# Patient Record
(demographics unavailable — no encounter records)

---

## 2025-01-17 NOTE — HISTORY OF PRESENT ILLNESS
[FreeTextEntry1] : follow-up, HTN, CVA [de-identified] : 63 year old male presents for a f/u visit. Currently he has no acute medical complaints. He denies any chest pain, palpitations or shortness of breath.

## 2025-01-17 NOTE — HISTORY OF PRESENT ILLNESS
[FreeTextEntry1] : follow-up, HTN, CVA [de-identified] : 63 year old male presents for a f/u visit. Currently he has no acute medical complaints. He denies any chest pain, palpitations or shortness of breath.

## 2025-01-17 NOTE — PLAN
[FreeTextEntry1] : H/o CVA  - lost f/u w/ neurology, strongly advised close f/u, c/w ASA/statin (pt was instructed to stop plavix).  Advised BP control and smoking cessation as well. HTN - controlled, c/w  norvasc to 10 mg and hctz 12.5 mg.  Pt advised smoking cessation.  Advised home BP monitoring.  Repeat labs today. HLD - c/w statin, repeat labs Elevated creatinine - likely CKD, will monitor, has referral for renal U/S Tobacco use disorder - pt counseled, will quit on his own, refer for CT chest HMT - UTD w/ psa screening, consider colonoscopy once medically optimized  f/u 3 months

## 2025-07-01 NOTE — HISTORY OF PRESENT ILLNESS
[FreeTextEntry1] : follow-up, epistaxis [de-identified] : 63 year old male presents with complaints of epistaxis.  Patient reports he has been having epistaxis from his right nostril almost every day for the last 5 days.  The last episode of epistaxis was earlier this morning, lasted about 1 minute and resolved with application of pressure.

## 2025-07-01 NOTE — PLAN
[FreeTextEntry1] : H/o epistaxis, recurrent - currently no active bleeding, will refer to ENT H/o CVA  - chronic, lost f/u w/ neurology, strongly advised close f/u, c/w ASA/statin (pt no longer taking plavix).  Advised BP control and smoking cessation as well. HTN -chronic, borderline controlled, c/w  norvasc to 10 mg and hctz 12.5 mg.  Pt advised smoking cessation.  Advised home BP monitoring.  Repeat labs today. HLD - c/w statin, repeat labs Elevated creatinine - likely CKD, will monitor, has referral for renal U/S Tobacco use disorder - pt counseled, will quit on his own, advised to complete CT chest HMT - Pt agreeable for psa screening, consider colonoscopy once medically optimized  f/u 1-2 weeks

## 2025-07-01 NOTE — REVIEW OF SYSTEMS
[Nosebleeds] : nosebleeds [Nasal Discharge] : nasal discharge [Fever] : no fever [Night Sweats] : no night sweats [Discharge] : no discharge [Vision Problems] : no vision problems [Earache] : no earache [Chest Pain] : no chest pain [Orthopena] : no orthopnea [Shortness Of Breath] : no shortness of breath [Abdominal Pain] : no abdominal pain [Vomiting] : no vomiting [Dysuria] : no dysuria [Hematuria] : no hematuria [Joint Pain] : no joint pain [Back Pain] : no back pain [Itching] : no itching [Skin Rash] : no skin rash [Headache] : no headache [Memory Loss] : no memory loss [Suicidal] : not suicidal [Easy Bleeding] : no easy bleeding

## 2025-07-10 NOTE — PHYSICAL EXAM
[de-identified] : dried blood right caudal septum [Midline] : trachea located in midline position [Normal] : no rashes

## 2025-07-10 NOTE — ASSESSMENT
[FreeTextEntry1] : 63 year old male with epistaxis right sp cauterization today. Discussed humidification techniques.

## 2025-07-10 NOTE — HISTORY OF PRESENT ILLNESS
[de-identified] : 63 year old male referred by Dr Vamshi Hoyos (PCP) for frequent epistaxis States last Thursday started to have right sided nose bleeds, has been having an episode once a day since then, lasts for about 2 minutes, alleviated by cold water.  No nasal sprays Taking aspirin daily

## 2025-07-10 NOTE — PROCEDURE
[FreeTextEntry1] : nasal endoscopy [FreeTextEntry2] : epistaxis [FreeTextEntry3] : Procedure: nasal endoscopy   Pre-operative diagnosis:   Indication: Anterior rhinoscopy insufficient to diagnose pathology  Details: After decongestant and lidocaine was sprayed in the bilateral nasal cavities, a flexible laryngoscope was inserted into the right nares. The nasal cavity, middle meatus, ETO, nasopharynx, and glottis were visualized. The endoscope was then inserted into the left nares and the nasal cavity, middle meatus, and ETO was visualized. The patient tolerated procedure well.  Findings: no active bleeding  no posterior clot normal posterior to caudal septum